# Patient Record
Sex: FEMALE | Race: OTHER | HISPANIC OR LATINO | URBAN - METROPOLITAN AREA
[De-identification: names, ages, dates, MRNs, and addresses within clinical notes are randomized per-mention and may not be internally consistent; named-entity substitution may affect disease eponyms.]

---

## 2018-04-01 ENCOUNTER — OUTPATIENT (OUTPATIENT)
Dept: OUTPATIENT SERVICES | Facility: HOSPITAL | Age: 53
LOS: 1 days | End: 2018-04-01
Payer: MEDICAID

## 2018-04-01 PROCEDURE — G9001: CPT

## 2018-04-17 DIAGNOSIS — R69 ILLNESS, UNSPECIFIED: ICD-10-CM

## 2022-06-13 ENCOUNTER — EMERGENCY (EMERGENCY)
Facility: HOSPITAL | Age: 57
LOS: 1 days | Discharge: HOME | End: 2022-06-13
Attending: EMERGENCY MEDICINE
Payer: COMMERCIAL

## 2022-06-13 VITALS
HEIGHT: 64 IN | OXYGEN SATURATION: 98 % | SYSTOLIC BLOOD PRESSURE: 142 MMHG | WEIGHT: 149.91 LBS | TEMPERATURE: 98 F | HEART RATE: 72 BPM | DIASTOLIC BLOOD PRESSURE: 74 MMHG | RESPIRATION RATE: 18 BRPM

## 2022-06-13 PROCEDURE — 99282 EMERGENCY DEPT VISIT SF MDM: CPT

## 2022-06-13 PROCEDURE — 99283 EMERGENCY DEPT VISIT LOW MDM: CPT

## 2022-06-13 NOTE — ED PROVIDER NOTE - NSFOLLOWUPCLINICS_GEN_ALL_ED_FT
[facility] - [place]  Dermatology  [address], [address]  [place] [address]  Phone: [phone]  Fax: [phone]  Follow Up Time: 1-3 Days

## 2022-06-13 NOTE — ED PROVIDER NOTE - PATIENT PORTAL LINK FT
You can access the FollowMyHealth Patient Portal offered by [facility] by registering at the following website: [email or web address]. By joining [facility]’s FollowMyHealth portal, you will also be able to view your health information using other applications (apps) compatible with our system.

## 2022-06-13 NOTE — ED ADULT NURSE NOTE - OBJECTIVE STATEMENT
Patient present to ED A&OX3 c/o rash that started to left lower posterior arm on saturday with itching worsen spreading to body c/o headache and dizziness , pain scale 8/10

## 2022-06-13 NOTE — ED PROVIDER NOTE - CLINICAL SUMMARY MEDICAL DECISION MAKING FREE TEXT BOX
No e/o shingles or cellulitis. No fever, lethargy. Not specifically allergic. Patient is well appearing, NAD, afebrile, hemodynamically stable. Discharged with instructions in further symptomatic care, strict return precautions, and need for derm f/u.

## 2022-06-13 NOTE — ED PROVIDER NOTE - PHYSICAL EXAMINATION
Afebrile, hemodynamically stable, saturating well  NAD, well appearing, sitting comfortably in bed, no WOB, speaking full sentences  Head NCAT  EOMI grossly, anicteric  MMM  No JVD  RRR, nml S1/S2, no m/r/g  Lungs CTAB, no w/r/r  Abd soft, NT, ND, nml BS, no rebound or guarding  AAO, CN's 3-12 grossly intact  CARTWRIGHT spontaneously, no leg cyanosis or edema  Skin warm, well perfused, barely visible skin-tone nonraised maculae to b/l forearms

## 2022-06-13 NOTE — ED PROVIDER NOTE - NSFOLLOWUPINSTRUCTIONS_ED_ALL_ED_FT
Please follow up with a dermatologist in 1-2 days.  Please use benadryl as needed for itchiness.  Please return to the emergency department if you have worsening rash, weakness, vomiting, fever, or any other symptoms.    Por favor, merry un seguimiento con un dermatólogo en 1-2 días.  Use benadryl según sea necesario para la picazón.  Regrese al departamento de emergencias si tiene un sarpullido que empeora, debilidad, vómitos, fiebre o cualquier otro síntoma.    Erupción cutánea en los adultos    Rash, Adult    Higinio erupción es un cambio en el color de la piel. Higinio erupción también puede cambiar la forma en que se siente la piel. Hay muchas afecciones y factores diferentes que pueden causar higinio erupción.    Siga estas indicaciones en hill casa:    El objetivo del tratamiento es calmar la picazón y evitar que la erupción se propague. Controle si hay algún cambio en alexander síntomas. Informe a hill médico acerca de los cambios. Estas indicaciones pueden ayudarlo con la afección:    Medicamentos      [place] o aplique los medicamentos de venta karin y los recetados solamente neri se lo haya indicado el médico. [place] puede incluir medicamentos:  •Para tratar la piel enrojecida o hinchada (cremas con corticoesteroides).  •Para tratar la picazón.  •Para tratar higinio alergia (antihistamínicos por vía oral).  •Para tratar síntomas muy graves (corticoesteroides por vía oral).    Cuidado de la piel   •Coloque paños fríos (compresas) en las zonas afectadas.  • No se rasque ni se refriegue la piel.  •Evite cubrir la erupción. Asegúrese de que la erupción esté expuesta al aire todo lo posible.    Control de la picazón y las molestias   •Evite los lorie o las duchas calientes. Estos pueden empeorar la picazón. Higinio ducha fría puede aliviar el malestar.  •Trate de danilo un baño con lo siguiente:  •Sales de [place]. Puede conseguirlas en la mayur de comestibles o la farmacia local. Siga las indicaciones del envase.  •Bicarbonato de sodio. Vierta un poco en la bañera neri se lo haya indicado el médico.  •Elton coloidal. Puede conseguirla en la mayur de comestibles o la farmacia local. Siga las indicaciones del envase.  •Intente colocarse higinio pasta de bicarbonato de sodio sobre la piel. Agregue agua al bicarbonato de sodio hasta que se forme higinio pasta.  •Intente aplicarse higinio loción que alivie la picazón (loción de calamina).  •Manténgase fresco y al resguardo leeann. La transpiración y el calor pueden empeorar la picazón.    Indicaciones generales   •Descanse todo lo que sea necesario.  •Esha suficiente líquido para mantener el pis (la orina) de color amarillo pálido.  •Use ropa holgada.  •Evite los detergentes y los jabones perfumados, y los perfumes. Utilice jabones, detergentes, perfumes y cosméticos suaves.  •Evite todo lo que le cause erupción cutánea. Lleve un diario neri ayuda para registrar lo que le causa erupción. Escriba los siguientes datos:  •Lo que come.  •Los cosméticos que utiliza.  •Lo que august.  •La ropa que usa. [place] incluye las alhajas.  •Concurra a todas las visitas de seguimiento neri se lo haya indicado el médico. [place] es importante.    Comuníquese con un médico si:  •Transpira de noche.  •Pierde peso.  •Orina más de lo normal.  •Orina menos de lo normal u observa que la orina es de color más oscuro que lo normal.  •Se siente débil.  •Vomita.  •Tiene un color amarillo en la piel o en las partes sonia del angy (ictericia).  •La piel:  •Hormiguea.  •Se adormece.  •La erupción cutánea:  •No desaparece después de unos días.  •Empeora.  •Usted:  •Está más sediento que lo habitual.  •Está más cansado que lo habitual.  •Tiene los siguientes síntomas:  •Síntomas nuevos.  •Dolor en el vientre (abdomen).  •Fiebre.  •Materia fecal líquida (diarrea).    Solicite ayuda inmediatamente si:  •Tiene fiebre, y los síntomas empeoran repentinamente.  •Empieza a sentirse desorientado (confundido).  •Siente un dolor de annabel intenso o tiene rigidez en el carmelita.  •Siente mucho dolor o rigidez en las articulaciones.  •Tiene higinio crisis de movimientos que no puede controlar (convulsiones).  •La erupción cubre todo el cuerpo o la mayor parte de jean. La erupción puede o no ser dolorosa.  •Tiene ampollas con las siguientes características:  •Se encuentran arriba de la erupción.  •Se agrandan.  •Crecen juntas.  •Son dolorosas.  •Están dentro de la nariz o la boca.  •Tiene higinio erupción cutánea con estas características:  •Tiene pequeñas manchas moradas, neri si fueran pinchazos, en todo el cuerpo.  •Tiene un “angy de buey” o se parece a un torres de tiro.  •Está enrojecida y le duele, le produce descamación de la piel y no se relaciona con mechelle estado mucho tiempo bajo el sol.    Resumen  •Higinio erupción es un cambio en el color de la piel. Higinio erupción también puede cambiar la forma en que se siente la piel.  •El objetivo del tratamiento es calmar la picazón y evitar que la erupción se propague.  •[place] o aplique los medicamentos de venta karin y los recetados solamente neri se lo haya indicado el médico.  •Comuníquese con un médico de inmediato si tiene síntomas nuevos o si alexander síntomas empeoran.  •Concurra a todas las visitas de seguimiento neri se lo haya indicado el médico. [place] es importante.    Esta información no tiene neri fin reemplazar el consejo del médico. Asegúrese de hacerle al médico cualquier pregunta que tenga.

## 2022-06-13 NOTE — ED PROVIDER NOTE - OBJECTIVE STATEMENT
Declines , uses herself and .  56yoF prev healthy, on no meds, no past allergies, presents with itchy rash that started to b/l forearms and then to her back/abdomen, since Saturday. Took benadryl without improvement. Had some HA and dizziness but improved significantly with Excedrin. Denies fever, chills, vomiting, diarrhea, CP, SOB, cough, congestion, recent sick contacts/illness/travel/hiking, insect bite, and all other symptoms.